# Patient Record
Sex: FEMALE | URBAN - METROPOLITAN AREA
[De-identification: names, ages, dates, MRNs, and addresses within clinical notes are randomized per-mention and may not be internally consistent; named-entity substitution may affect disease eponyms.]

---

## 2024-02-26 ENCOUNTER — OFFICE VISIT (OUTPATIENT)
Dept: ORTHOPEDIC SURGERY | Age: 69
End: 2024-02-26

## 2024-02-26 VITALS — HEIGHT: 65 IN | BODY MASS INDEX: 27.82 KG/M2 | WEIGHT: 167 LBS

## 2024-02-26 DIAGNOSIS — M25.562 ACUTE PAIN OF LEFT KNEE: Primary | ICD-10-CM

## 2024-02-26 PROCEDURE — 1123F ACP DISCUSS/DSCN MKR DOCD: CPT | Performed by: PHYSICIAN ASSISTANT

## 2024-02-26 PROCEDURE — 99204 OFFICE O/P NEW MOD 45 MIN: CPT | Performed by: PHYSICIAN ASSISTANT

## 2024-02-26 RX ORDER — METHYLPREDNISOLONE 4 MG/1
TABLET ORAL
Qty: 1 KIT | Refills: 0 | Status: SHIPPED | OUTPATIENT
Start: 2024-02-26

## 2024-02-27 NOTE — PROGRESS NOTES
Dr Jaret Alvarez      Date /Time 2/26/2024       7:04 PM EST  Name Whitney Edwards             1955   [facility]  MRN [id number]                Chief Complaint   Patient presents with    Knee Pain     [facility] Left Knee        History of Present Illness  Whitney Edwards is a 69 y.o. female who presents with  left knee pain, .          Injury Mechanism:  direct trauma.  Worker's Comp. & legal issues:   none.  Previous Treatments: Ice, Heat, and NSAIDs    Patient presents to the after-hours clinic with a new problem.  Patient here with a chief complaint of left knee pain.  Patient's left knee became painful many months ago.  She then landed awkwardly on the knee and has had increased pain symptoms since.  Her pain is concentrated medial.  She does have increased pain with activities and improvement with rest.  She has tried ibuprofen with only mild improvement    Past History  No past medical history on file.  No past surgical history on file.  Social History     Tobacco Use    Smoking status: Never    Smokeless tobacco: Never   Substance Use Topics    Alcohol use: Not on file      No current outpatient medications on file prior to visit.     No current facility-administered medications on file prior to visit.      ASCVD 10-YEAR RISK SCORE  The ASCVD Risk score (Faye DK, et al., 2019) failed to calculate for the following reasons:    The systolic blood pressure is missing    Cannot find a previous HDL lab    Cannot find a previous total cholesterol lab    Unable to determine if patient is Non-      Review of Systems  10-point ROS is negative other than HPI.    Physical Exam  Based off 1997 Exam Criteria  Ht 1.64 m (5' 4.57\")   Wt 75.8 kg (167 lb)   BMI 28.16 kg/m²      Constitutional:       General: He is not in acute distress.     Appearance: Normal appearance.   Cardiovascular:      Rate and Rhythm: Normal rate and regular rhythm.      Pulses: Normal pulses.   Pulmonary: